# Patient Record
Sex: FEMALE | Race: WHITE | NOT HISPANIC OR LATINO | URBAN - METROPOLITAN AREA
[De-identification: names, ages, dates, MRNs, and addresses within clinical notes are randomized per-mention and may not be internally consistent; named-entity substitution may affect disease eponyms.]

---

## 2017-02-02 ENCOUNTER — OFFICE (OUTPATIENT)
Dept: URBAN - METROPOLITAN AREA CLINIC 19 | Facility: CLINIC | Age: 47
End: 2017-02-02

## 2017-02-02 VITALS
DIASTOLIC BLOOD PRESSURE: 74 MMHG | WEIGHT: 173 LBS | HEART RATE: 76 BPM | HEIGHT: 65 IN | SYSTOLIC BLOOD PRESSURE: 107 MMHG

## 2017-02-02 DIAGNOSIS — E66.3 OVERWEIGHT: ICD-10-CM

## 2017-02-02 DIAGNOSIS — F32.9 MAJOR DEPRESSIVE DISORDER, SINGLE EPISODE, UNSPECIFIED: ICD-10-CM

## 2017-02-02 DIAGNOSIS — K44.9 DIAPHRAGMATIC HERNIA WITHOUT OBSTRUCTION OR GANGRENE: ICD-10-CM

## 2017-02-02 DIAGNOSIS — K31.84 GASTROPARESIS: ICD-10-CM

## 2017-02-02 DIAGNOSIS — K21.9 GASTRO-ESOPHAGEAL REFLUX DISEASE WITHOUT ESOPHAGITIS: ICD-10-CM

## 2017-02-02 DIAGNOSIS — Z86.010 PERSONAL HISTORY OF COLONIC POLYPS: ICD-10-CM

## 2017-02-02 DIAGNOSIS — R63.5 ABNORMAL WEIGHT GAIN: ICD-10-CM

## 2017-02-02 PROCEDURE — 99214 OFFICE O/P EST MOD 30 MIN: CPT | Performed by: INTERNAL MEDICINE

## 2017-05-09 ENCOUNTER — OFFICE (OUTPATIENT)
Dept: URBAN - METROPOLITAN AREA CLINIC 19 | Facility: CLINIC | Age: 47
End: 2017-05-09

## 2017-05-09 VITALS
WEIGHT: 162 LBS | HEART RATE: 91 BPM | DIASTOLIC BLOOD PRESSURE: 83 MMHG | HEIGHT: 65 IN | SYSTOLIC BLOOD PRESSURE: 117 MMHG

## 2017-05-09 DIAGNOSIS — R10.816 EPIGASTRIC ABDOMINAL TENDERNESS: ICD-10-CM

## 2017-05-09 DIAGNOSIS — K44.9 DIAPHRAGMATIC HERNIA WITHOUT OBSTRUCTION OR GANGRENE: ICD-10-CM

## 2017-05-09 DIAGNOSIS — K21.9 GASTRO-ESOPHAGEAL REFLUX DISEASE WITHOUT ESOPHAGITIS: ICD-10-CM

## 2017-05-09 DIAGNOSIS — K31.84 GASTROPARESIS: ICD-10-CM

## 2017-05-09 DIAGNOSIS — Z86.010 PERSONAL HISTORY OF COLONIC POLYPS: ICD-10-CM

## 2017-05-09 LAB
AMYLASE: AMYLASE,SERUM: 60 U/L (ref 28–120)
HEPATIC FUNCTION PANEL A: ALBUMIN: 4.3 G/DL (ref 3.5–5.2)
HEPATIC FUNCTION PANEL A: ALKALINE PHOSPHATASE: 61 U/L (ref 38–103)
HEPATIC FUNCTION PANEL A: DIRECT BILIRUBIN: 0.2 MG/DL (ref 0–0.2)
HEPATIC FUNCTION PANEL A: SGOT (AST): 14 U/L (ref 13–40)
HEPATIC FUNCTION PANEL A: SGPT (ALT): 13 U/L (ref 7–52)
HEPATIC FUNCTION PANEL A: TOTAL BILIRUBIN: 1.3 MG/DL — HIGH (ref 0.3–1.2)
HEPATIC FUNCTION PANEL A: TOTAL PROTEIN: 6.6 G/DL (ref 6.4–8.3)
LIPASE: 32 U/L (ref 11–82)

## 2017-05-09 PROCEDURE — 99214 OFFICE O/P EST MOD 30 MIN: CPT | Performed by: INTERNAL MEDICINE

## 2017-09-12 ENCOUNTER — OFFICE (OUTPATIENT)
Dept: URBAN - METROPOLITAN AREA CLINIC 19 | Facility: CLINIC | Age: 47
End: 2017-09-12

## 2017-09-12 VITALS
HEART RATE: 79 BPM | HEIGHT: 65 IN | SYSTOLIC BLOOD PRESSURE: 132 MMHG | WEIGHT: 160 LBS | DIASTOLIC BLOOD PRESSURE: 89 MMHG

## 2017-09-12 DIAGNOSIS — Z83.71 FAMILY HISTORY OF COLONIC POLYPS: ICD-10-CM

## 2017-09-12 DIAGNOSIS — Z86.010 PERSONAL HISTORY OF COLONIC POLYPS: ICD-10-CM

## 2017-09-12 DIAGNOSIS — E66.3 OVERWEIGHT: ICD-10-CM

## 2017-09-12 DIAGNOSIS — K21.9 GASTRO-ESOPHAGEAL REFLUX DISEASE WITHOUT ESOPHAGITIS: ICD-10-CM

## 2017-09-12 DIAGNOSIS — K31.84 GASTROPARESIS: ICD-10-CM

## 2017-09-12 PROCEDURE — 99213 OFFICE O/P EST LOW 20 MIN: CPT | Performed by: INTERNAL MEDICINE

## 2020-06-11 ENCOUNTER — TELEHEALTH PROVIDED OTHER THAN IN PATIENT'S HOME (OUTPATIENT)
Dept: URBAN - METROPOLITAN AREA TELEHEALTH 10 | Facility: TELEHEALTH | Age: 50
End: 2020-06-11

## 2020-06-11 VITALS — HEIGHT: 65 IN

## 2020-06-11 DIAGNOSIS — R63.5 ABNORMAL WEIGHT GAIN: ICD-10-CM

## 2020-06-11 DIAGNOSIS — K31.84 GASTROPARESIS: ICD-10-CM

## 2020-06-11 DIAGNOSIS — K21.9 GASTRO-ESOPHAGEAL REFLUX DISEASE WITHOUT ESOPHAGITIS: ICD-10-CM

## 2020-06-11 DIAGNOSIS — Z86.010 PERSONAL HISTORY OF COLONIC POLYPS: ICD-10-CM

## 2020-06-11 NOTE — SERVICEHPINOTES
50-year-old white female here for a Telehealth f/u visit. Currently taking over-the-counter Prilosec 20 mg daily and reports good control of reflux. Currently taking metoclopramide 5 mg once a day at bedtime.  Denies any reflux symptoms.  Denies any nausea or vomiting.  Denies any symptoms to suggest EPS.  Reports being diagnosed with bilateral carotid stenosis involving 50% of the vessel.  Reports that recent treadmill was normal.  Denies any hematemesis or melena or hematochezia.  Bowel movements are regular.  Reports weight gain of 10 lb during the lockdown period.Gastric emptying study in Nov 2016 concerning for gastroparesis. EGD and Colonoscopy in Nov 2016: small hiatal hernia and antral gastritis. Biopsies were negative for H.pylori and celiac sprue but revealed active esophagitis. Colonoscopy with removal of two small tubular adenomas but prep was suboptimal. Repeat colonoscopy in Dec 2016 with internal hemorrhoids and removal of a small hyperplastic polyp from the sigmoid. She had an EGD in 2004 by Dr. Mckinney which revealed a hiatal hernia. Colonoscopy at the same time was exam to the cecum, good prep and was normal and biopsies revealed melanosis coli. Gastric emptying study in April 2016 revealed slow emptying after the first hour. No family history of colon cancer but mother was diagnosed with colon polyps in her 50s. She had an EGD in 2004 by Dr. Mckinney which revealed a hiatal hernia. Colonoscopy at the same time was exam to the cecum, good prep and was normal and biopsies revealed melanosis coli.

## 2021-01-28 ENCOUNTER — OFFICE (OUTPATIENT)
Dept: URBAN - METROPOLITAN AREA CLINIC 19 | Facility: CLINIC | Age: 51
End: 2021-01-28

## 2021-01-28 VITALS
OXYGEN SATURATION: 99 % | HEIGHT: 65 IN | WEIGHT: 148 LBS | SYSTOLIC BLOOD PRESSURE: 114 MMHG | HEART RATE: 84 BPM | DIASTOLIC BLOOD PRESSURE: 62 MMHG

## 2021-01-28 DIAGNOSIS — K31.84 GASTROPARESIS: ICD-10-CM

## 2021-01-28 DIAGNOSIS — K21.9 GASTRO-ESOPHAGEAL REFLUX DISEASE WITHOUT ESOPHAGITIS: ICD-10-CM

## 2021-01-28 DIAGNOSIS — Z86.010 PERSONAL HISTORY OF COLONIC POLYPS: ICD-10-CM

## 2021-01-28 DIAGNOSIS — R11.2 NAUSEA WITH VOMITING, UNSPECIFIED: ICD-10-CM

## 2021-01-28 DIAGNOSIS — K92.1 MELENA: ICD-10-CM

## 2021-01-28 DIAGNOSIS — R10.816 EPIGASTRIC ABDOMINAL TENDERNESS: ICD-10-CM

## 2021-01-28 LAB
AMYLASE: 58 U/L (ref 31–110)
CBC, PLATELET, NO DIFFERENTIAL: HEMATOCRIT: 39.8 % (ref 34–46.6)
CBC, PLATELET, NO DIFFERENTIAL: HEMOGLOBIN: 12.8 G/DL (ref 11.1–15.9)
CBC, PLATELET, NO DIFFERENTIAL: MCH: 30 PG (ref 26.6–33)
CBC, PLATELET, NO DIFFERENTIAL: MCHC: 32.2 G/DL (ref 31.5–35.7)
CBC, PLATELET, NO DIFFERENTIAL: MCV: 93 FL (ref 79–97)
CBC, PLATELET, NO DIFFERENTIAL: PLATELETS: 368 X10E3/UL (ref 150–450)
CBC, PLATELET, NO DIFFERENTIAL: RBC: 4.27 X10E6/UL (ref 3.77–5.28)
CBC, PLATELET, NO DIFFERENTIAL: RDW: 13.5 % (ref 11.7–15.4)
CBC, PLATELET, NO DIFFERENTIAL: WBC: 7.3 X10E3/UL (ref 3.4–10.8)
HEPATIC FUNCTION PANEL (7): ALBUMIN: 3.8 G/DL (ref 3.8–4.9)
HEPATIC FUNCTION PANEL (7): ALKALINE PHOSPHATASE: 71 IU/L (ref 39–117)
HEPATIC FUNCTION PANEL (7): ALT (SGPT): 62 IU/L — HIGH (ref 0–32)
HEPATIC FUNCTION PANEL (7): AST (SGOT): 57 IU/L — HIGH (ref 0–40)
HEPATIC FUNCTION PANEL (7): BILIRUBIN, DIRECT: 0.14 MG/DL (ref 0–0.4)
HEPATIC FUNCTION PANEL (7): BILIRUBIN, TOTAL: 0.5 MG/DL (ref 0–1.2)
HEPATIC FUNCTION PANEL (7): PROTEIN, TOTAL: 6.3 G/DL (ref 6–8.5)
LIPASE: 28 U/L (ref 14–72)

## 2021-01-28 PROCEDURE — 99214 OFFICE O/P EST MOD 30 MIN: CPT | Performed by: INTERNAL MEDICINE

## 2021-01-28 RX ORDER — METOCLOPRAMIDE 5 MG/1
TABLET ORAL
Qty: 90 | Refills: 11 | Status: ACTIVE
Start: 2016-11-15

## 2021-01-28 RX ORDER — OMEPRAZOLE 20 MG/1
CAPSULE, DELAYED RELEASE ORAL
Qty: 60 | Refills: 11 | Status: ACTIVE

## 2021-02-03 ENCOUNTER — OFFICE (OUTPATIENT)
Dept: URBAN - METROPOLITAN AREA PATHOLOGY 22 | Facility: PATHOLOGY | Age: 51
End: 2021-02-03

## 2021-02-03 ENCOUNTER — AMBULATORY SURGICAL CENTER (OUTPATIENT)
Dept: URBAN - METROPOLITAN AREA SURGERY 2 | Facility: SURGERY | Age: 51
End: 2021-02-03

## 2021-02-03 VITALS
DIASTOLIC BLOOD PRESSURE: 54 MMHG | SYSTOLIC BLOOD PRESSURE: 106 MMHG | DIASTOLIC BLOOD PRESSURE: 64 MMHG | OXYGEN SATURATION: 98 % | DIASTOLIC BLOOD PRESSURE: 64 MMHG | HEART RATE: 79 BPM | HEIGHT: 65 IN | RESPIRATION RATE: 16 BRPM | OXYGEN SATURATION: 98 % | SYSTOLIC BLOOD PRESSURE: 114 MMHG | SYSTOLIC BLOOD PRESSURE: 110 MMHG | RESPIRATION RATE: 18 BRPM | SYSTOLIC BLOOD PRESSURE: 106 MMHG | HEART RATE: 73 BPM | DIASTOLIC BLOOD PRESSURE: 74 MMHG | HEIGHT: 65 IN | OXYGEN SATURATION: 98 % | HEART RATE: 80 BPM | SYSTOLIC BLOOD PRESSURE: 121 MMHG | SYSTOLIC BLOOD PRESSURE: 114 MMHG | HEART RATE: 80 BPM | OXYGEN SATURATION: 100 % | SYSTOLIC BLOOD PRESSURE: 121 MMHG | DIASTOLIC BLOOD PRESSURE: 54 MMHG | HEART RATE: 80 BPM | DIASTOLIC BLOOD PRESSURE: 74 MMHG | DIASTOLIC BLOOD PRESSURE: 74 MMHG | HEIGHT: 65 IN | SYSTOLIC BLOOD PRESSURE: 110 MMHG | HEART RATE: 73 BPM | OXYGEN SATURATION: 100 % | DIASTOLIC BLOOD PRESSURE: 64 MMHG | RESPIRATION RATE: 16 BRPM | DIASTOLIC BLOOD PRESSURE: 78 MMHG | HEART RATE: 73 BPM | SYSTOLIC BLOOD PRESSURE: 106 MMHG | HEART RATE: 76 BPM | RESPIRATION RATE: 18 BRPM | HEART RATE: 76 BPM | WEIGHT: 150 LBS | TEMPERATURE: 98.2 F | HEART RATE: 72 BPM | DIASTOLIC BLOOD PRESSURE: 60 MMHG | RESPIRATION RATE: 16 BRPM | HEART RATE: 79 BPM | SYSTOLIC BLOOD PRESSURE: 131 MMHG | WEIGHT: 150 LBS | TEMPERATURE: 97.7 F | DIASTOLIC BLOOD PRESSURE: 78 MMHG | SYSTOLIC BLOOD PRESSURE: 131 MMHG | SYSTOLIC BLOOD PRESSURE: 114 MMHG | OXYGEN SATURATION: 100 % | DIASTOLIC BLOOD PRESSURE: 54 MMHG | HEART RATE: 79 BPM | DIASTOLIC BLOOD PRESSURE: 60 MMHG | TEMPERATURE: 98.2 F | TEMPERATURE: 97.7 F | DIASTOLIC BLOOD PRESSURE: 78 MMHG | HEART RATE: 76 BPM | HEART RATE: 72 BPM | SYSTOLIC BLOOD PRESSURE: 131 MMHG | HEART RATE: 72 BPM | TEMPERATURE: 98.2 F | DIASTOLIC BLOOD PRESSURE: 60 MMHG | WEIGHT: 150 LBS | SYSTOLIC BLOOD PRESSURE: 110 MMHG | RESPIRATION RATE: 18 BRPM | SYSTOLIC BLOOD PRESSURE: 121 MMHG | TEMPERATURE: 97.7 F

## 2021-02-03 DIAGNOSIS — K44.9 DIAPHRAGMATIC HERNIA WITHOUT OBSTRUCTION OR GANGRENE: ICD-10-CM

## 2021-02-03 DIAGNOSIS — R11.2 NAUSEA WITH VOMITING, UNSPECIFIED: ICD-10-CM

## 2021-02-03 DIAGNOSIS — K29.50 UNSPECIFIED CHRONIC GASTRITIS WITHOUT BLEEDING: ICD-10-CM

## 2021-02-03 PROBLEM — K31.89 OTHER DISEASES OF STOMACH AND DUODENUM: Status: ACTIVE | Noted: 2021-02-03

## 2021-02-03 PROCEDURE — 43239 EGD BIOPSY SINGLE/MULTIPLE: CPT | Performed by: INTERNAL MEDICINE

## 2021-02-03 PROCEDURE — 88313 SPECIAL STAINS GROUP 2: CPT | Performed by: INTERNAL MEDICINE

## 2021-02-03 PROCEDURE — 88305 TISSUE EXAM BY PATHOLOGIST: CPT | Performed by: INTERNAL MEDICINE

## 2021-02-03 PROCEDURE — 88342 IMHCHEM/IMCYTCHM 1ST ANTB: CPT | Performed by: INTERNAL MEDICINE

## 2021-03-04 ENCOUNTER — OFFICE (OUTPATIENT)
Dept: URBAN - METROPOLITAN AREA CLINIC 19 | Facility: CLINIC | Age: 51
End: 2021-03-04

## 2021-03-04 DIAGNOSIS — K80.20 CALCULUS OF GALLBLADDER WITHOUT CHOLECYSTITIS WITHOUT OBSTRU: ICD-10-CM

## 2021-03-04 PROCEDURE — 76705 ECHO EXAM OF ABDOMEN: CPT | Performed by: INTERNAL MEDICINE

## 2021-05-26 ENCOUNTER — OFFICE (OUTPATIENT)
Dept: URBAN - METROPOLITAN AREA TELEHEALTH 10 | Facility: TELEHEALTH | Age: 51
End: 2021-05-26

## 2021-05-26 VITALS — HEIGHT: 65 IN

## 2021-05-26 DIAGNOSIS — Z86.010 PERSONAL HISTORY OF COLONIC POLYPS: ICD-10-CM

## 2021-05-26 DIAGNOSIS — R11.2 NAUSEA WITH VOMITING, UNSPECIFIED: ICD-10-CM

## 2021-05-26 DIAGNOSIS — R74.8 ABNORMAL LEVELS OF OTHER SERUM ENZYMES: ICD-10-CM

## 2021-05-26 DIAGNOSIS — K21.9 GASTRO-ESOPHAGEAL REFLUX DISEASE WITHOUT ESOPHAGITIS: ICD-10-CM

## 2021-05-26 DIAGNOSIS — K31.84 GASTROPARESIS: ICD-10-CM

## 2021-05-26 RX ORDER — METOCLOPRAMIDE 5 MG/1
TABLET ORAL
Qty: 90 | Refills: 11 | Status: ACTIVE
Start: 2016-11-15

## 2021-05-26 RX ORDER — OMEPRAZOLE 20 MG/1
CAPSULE, DELAYED RELEASE ORAL
Qty: 60 | Refills: 11 | Status: ACTIVE

## 2021-05-26 NOTE — SERVICEHPINOTES
51-year-old white female here for a Telehealth follow-up visit.  Evaluated in January 2021 for abdominal pain, nausea, vomiting and dark stools and bilious emesis. Labs in January 2021 revealed normal pancreatic enzymes and CBC.  AST was 57 and ALT was 62. EGD revealed small hiatal hernia, erythema and erosions in the stomach. Biopsies were negative for H pylori/celiac sprue.  Ultrasound of the liver revealed cholelithiasis.  There was no evidence of gallbladder inflammation.  Liver appeared normal and there was no biliary dilation.  Currently taking Reglan twice daily.  Denies any symptoms to suggest EPS.  Taking omeprazole once a day.  Reports resolution of the abdominal pain, nausea and vomiting.  Bowel movements are regular.  Reports recent blood work at 's office.  Denies any alcohol intake.  Using NSAIDs occasionally.Reports being diagnosed with bilateral carotid stenosis involving 50% of the vessel. Reports that a treadmill test done in 2020 was normal. Gastric emptying study in Nov 2016 concerning for gastroparesis. EGD and Colonoscopy in Nov 2016: small hiatal hernia and antral gastritis. Biopsies were negative for H.pylori and celiac sprue but revealed active esophagitis. Colonoscopy with removal of two small tubular adenomas but prep was suboptimal. Repeat colonoscopy in Dec 2016 with internal hemorrhoids and removal of a small hyperplastic polyp from the sigmoid. She had an EGD in 2004 by Dr. Mckinney which revealed a hiatal hernia. Colonoscopy at the same time was exam to the cecum, good prep and was normal and biopsies revealed melanosis coli. Gastric emptying study in April 2016 revealed slow emptying after the first hour. No family history of colon cancer but mother was diagnosed with colon polyps in her 50s. She had an EGD in 2004 by Dr. Mckinney which revealed a hiatal hernia. Colonoscopy at the same time was exam to the cecum, good prep and was normal and biopsies revealed melanosis coli.

## 2021-12-02 ENCOUNTER — OFFICE (OUTPATIENT)
Dept: URBAN - METROPOLITAN AREA CLINIC 19 | Facility: CLINIC | Age: 51
End: 2021-12-02

## 2021-12-02 VITALS
HEART RATE: 81 BPM | DIASTOLIC BLOOD PRESSURE: 70 MMHG | WEIGHT: 158 LBS | HEIGHT: 65 IN | OXYGEN SATURATION: 100 % | SYSTOLIC BLOOD PRESSURE: 114 MMHG

## 2021-12-02 DIAGNOSIS — K21.9 GASTRO-ESOPHAGEAL REFLUX DISEASE WITHOUT ESOPHAGITIS: ICD-10-CM

## 2021-12-02 DIAGNOSIS — R11.0 NAUSEA: ICD-10-CM

## 2021-12-02 DIAGNOSIS — K80.20 CALCULUS OF GALLBLADDER WITHOUT CHOLECYSTITIS WITHOUT OBSTRU: ICD-10-CM

## 2021-12-02 DIAGNOSIS — Z83.71 FAMILY HISTORY OF COLONIC POLYPS: ICD-10-CM

## 2021-12-02 DIAGNOSIS — Z86.010 PERSONAL HISTORY OF COLONIC POLYPS: ICD-10-CM

## 2021-12-02 DIAGNOSIS — R74.01 ELEVATION OF LEVELS OF LIVER TRANSAMINASE LEVELS: ICD-10-CM

## 2021-12-02 DIAGNOSIS — K31.84 GASTROPARESIS: ICD-10-CM

## 2021-12-02 PROCEDURE — 99214 OFFICE O/P EST MOD 30 MIN: CPT | Performed by: INTERNAL MEDICINE

## 2021-12-02 RX ORDER — SODIUM PICOSULFATE, MAGNESIUM OXIDE, AND ANHYDROUS CITRIC ACID 10; 3.5; 12 MG/160ML; G/160ML; G/160ML
LIQUID ORAL
Qty: 320 | Refills: 0 | Status: COMPLETED
Start: 2021-12-02 | End: 2021-12-20

## 2021-12-02 RX ORDER — OMEPRAZOLE 20 MG/1
CAPSULE, DELAYED RELEASE ORAL
Qty: 60 | Refills: 11 | Status: ACTIVE

## 2021-12-20 ENCOUNTER — AMBULATORY SURGICAL CENTER (OUTPATIENT)
Dept: URBAN - METROPOLITAN AREA SURGERY 2 | Facility: SURGERY | Age: 51
End: 2021-12-20
Payer: COMMERCIAL

## 2021-12-20 ENCOUNTER — OFFICE (OUTPATIENT)
Dept: URBAN - METROPOLITAN AREA PATHOLOGY 22 | Facility: PATHOLOGY | Age: 51
End: 2021-12-20

## 2021-12-20 ENCOUNTER — AMBULATORY SURGICAL CENTER (OUTPATIENT)
Dept: URBAN - METROPOLITAN AREA SURGERY 2 | Facility: SURGERY | Age: 51
End: 2021-12-20

## 2021-12-20 VITALS
DIASTOLIC BLOOD PRESSURE: 59 MMHG | SYSTOLIC BLOOD PRESSURE: 134 MMHG | TEMPERATURE: 98 F | HEART RATE: 70 BPM | SYSTOLIC BLOOD PRESSURE: 134 MMHG | DIASTOLIC BLOOD PRESSURE: 59 MMHG | OXYGEN SATURATION: 99 % | RESPIRATION RATE: 16 BRPM | DIASTOLIC BLOOD PRESSURE: 77 MMHG | HEART RATE: 67 BPM | OXYGEN SATURATION: 98 % | TEMPERATURE: 98.2 F | DIASTOLIC BLOOD PRESSURE: 77 MMHG | OXYGEN SATURATION: 99 % | OXYGEN SATURATION: 97 % | HEART RATE: 71 BPM | SYSTOLIC BLOOD PRESSURE: 129 MMHG | HEART RATE: 71 BPM | SYSTOLIC BLOOD PRESSURE: 129 MMHG | HEART RATE: 73 BPM | HEIGHT: 65 IN | HEART RATE: 71 BPM | DIASTOLIC BLOOD PRESSURE: 68 MMHG | OXYGEN SATURATION: 99 % | HEART RATE: 73 BPM | SYSTOLIC BLOOD PRESSURE: 141 MMHG | TEMPERATURE: 98 F | DIASTOLIC BLOOD PRESSURE: 80 MMHG | SYSTOLIC BLOOD PRESSURE: 134 MMHG | DIASTOLIC BLOOD PRESSURE: 59 MMHG | SYSTOLIC BLOOD PRESSURE: 135 MMHG | DIASTOLIC BLOOD PRESSURE: 77 MMHG | HEART RATE: 70 BPM | OXYGEN SATURATION: 98 % | SYSTOLIC BLOOD PRESSURE: 150 MMHG | SYSTOLIC BLOOD PRESSURE: 150 MMHG | DIASTOLIC BLOOD PRESSURE: 68 MMHG | OXYGEN SATURATION: 97 % | TEMPERATURE: 98 F | RESPIRATION RATE: 16 BRPM | HEIGHT: 65 IN | HEART RATE: 70 BPM | TEMPERATURE: 98.2 F | WEIGHT: 155 LBS | SYSTOLIC BLOOD PRESSURE: 135 MMHG | OXYGEN SATURATION: 97 % | OXYGEN SATURATION: 98 % | DIASTOLIC BLOOD PRESSURE: 79 MMHG | WEIGHT: 155 LBS | DIASTOLIC BLOOD PRESSURE: 80 MMHG | RESPIRATION RATE: 16 BRPM | TEMPERATURE: 98.2 F | SYSTOLIC BLOOD PRESSURE: 141 MMHG | HEART RATE: 73 BPM | SYSTOLIC BLOOD PRESSURE: 129 MMHG | WEIGHT: 155 LBS | DIASTOLIC BLOOD PRESSURE: 79 MMHG | SYSTOLIC BLOOD PRESSURE: 150 MMHG | HEART RATE: 67 BPM | DIASTOLIC BLOOD PRESSURE: 80 MMHG | DIASTOLIC BLOOD PRESSURE: 79 MMHG | HEIGHT: 65 IN | SYSTOLIC BLOOD PRESSURE: 141 MMHG | DIASTOLIC BLOOD PRESSURE: 68 MMHG | SYSTOLIC BLOOD PRESSURE: 135 MMHG | HEART RATE: 67 BPM

## 2021-12-20 DIAGNOSIS — Z86.010 PERSONAL HISTORY OF COLONIC POLYPS: ICD-10-CM

## 2021-12-20 DIAGNOSIS — K21.9 GASTRO-ESOPHAGEAL REFLUX DISEASE WITHOUT ESOPHAGITIS: ICD-10-CM

## 2021-12-20 DIAGNOSIS — R11.0 NAUSEA: ICD-10-CM

## 2021-12-20 DIAGNOSIS — K31.89 OTHER DISEASES OF STOMACH AND DUODENUM: ICD-10-CM

## 2021-12-20 PROCEDURE — G0105 COLORECTAL SCRN; HI RISK IND: HCPCS | Performed by: INTERNAL MEDICINE

## 2021-12-20 PROCEDURE — 88305 TISSUE EXAM BY PATHOLOGIST: CPT | Performed by: INTERNAL MEDICINE

## 2021-12-20 PROCEDURE — 43239 EGD BIOPSY SINGLE/MULTIPLE: CPT | Mod: 51 | Performed by: INTERNAL MEDICINE

## 2022-01-05 ENCOUNTER — OFFICE (OUTPATIENT)
Dept: URBAN - METROPOLITAN AREA PATHOLOGY 22 | Facility: PATHOLOGY | Age: 52
End: 2022-01-05

## 2022-01-05 DIAGNOSIS — R74.01 ELEVATION OF LEVELS OF LIVER TRANSAMINASE LEVELS: ICD-10-CM

## 2022-01-05 PROCEDURE — 88313 SPECIAL STAINS GROUP 2: CPT

## 2022-01-05 PROCEDURE — 88307 TISSUE EXAM BY PATHOLOGIST: CPT

## 2022-03-08 ENCOUNTER — OFFICE (OUTPATIENT)
Dept: URBAN - METROPOLITAN AREA CLINIC 19 | Facility: CLINIC | Age: 52
End: 2022-03-08

## 2022-06-02 ENCOUNTER — OFFICE (OUTPATIENT)
Dept: URBAN - METROPOLITAN AREA CLINIC 19 | Facility: CLINIC | Age: 52
End: 2022-06-02
Payer: COMMERCIAL

## 2022-06-02 VITALS
SYSTOLIC BLOOD PRESSURE: 117 MMHG | DIASTOLIC BLOOD PRESSURE: 73 MMHG | WEIGHT: 164 LBS | HEIGHT: 65 IN | OXYGEN SATURATION: 99 % | HEART RATE: 85 BPM

## 2022-06-02 DIAGNOSIS — R10.13 EPIGASTRIC PAIN: ICD-10-CM

## 2022-06-02 DIAGNOSIS — R74.01 ELEVATION OF LEVELS OF LIVER TRANSAMINASE LEVELS: ICD-10-CM

## 2022-06-02 DIAGNOSIS — R10.12 LEFT UPPER QUADRANT PAIN: ICD-10-CM

## 2022-06-02 DIAGNOSIS — K21.9 GASTRO-ESOPHAGEAL REFLUX DISEASE WITHOUT ESOPHAGITIS: ICD-10-CM

## 2022-06-02 DIAGNOSIS — R10.816 EPIGASTRIC ABDOMINAL TENDERNESS: ICD-10-CM

## 2022-06-02 DIAGNOSIS — Z23 ENCOUNTER FOR IMMUNIZATION: ICD-10-CM

## 2022-06-02 DIAGNOSIS — K31.84 GASTROPARESIS: ICD-10-CM

## 2022-06-02 DIAGNOSIS — R76.8 OTHER SPECIFIED ABNORMAL IMMUNOLOGICAL FINDINGS IN SERUM: ICD-10-CM

## 2022-06-02 LAB
AMYLASE: 70 U/L (ref 31–110)
HEPATIC FUNCTION PANEL (7): ALBUMIN: 4.3 G/DL (ref 3.8–4.9)
HEPATIC FUNCTION PANEL (7): ALKALINE PHOSPHATASE: 111 IU/L (ref 44–121)
HEPATIC FUNCTION PANEL (7): ALT (SGPT): 85 IU/L — HIGH (ref 0–32)
HEPATIC FUNCTION PANEL (7): AST (SGOT): 67 IU/L — HIGH (ref 0–40)
HEPATIC FUNCTION PANEL (7): BILIRUBIN, DIRECT: 0.17 MG/DL (ref 0–0.4)
HEPATIC FUNCTION PANEL (7): BILIRUBIN, TOTAL: 0.7 MG/DL (ref 0–1.2)
HEPATIC FUNCTION PANEL (7): PROTEIN, TOTAL: 7 G/DL (ref 6–8.5)
LIPASE: 21 U/L (ref 14–72)

## 2022-06-02 PROCEDURE — 90632 HEPA VACCINE ADULT IM: CPT | Performed by: INTERNAL MEDICINE

## 2022-06-02 PROCEDURE — 96372 THER/PROPH/DIAG INJ SC/IM: CPT | Performed by: INTERNAL MEDICINE

## 2022-06-02 PROCEDURE — 99214 OFFICE O/P EST MOD 30 MIN: CPT | Mod: 25 | Performed by: INTERNAL MEDICINE

## 2022-06-02 RX ORDER — METOCLOPRAMIDE 5 MG/1
TABLET ORAL
Qty: 90 | Refills: 11 | Status: ACTIVE
Start: 2016-11-15

## 2022-06-02 RX ORDER — OMEPRAZOLE 20 MG/1
CAPSULE, DELAYED RELEASE ORAL
Qty: 60 | Refills: 11 | Status: ACTIVE

## 2022-11-29 ENCOUNTER — OFFICE (OUTPATIENT)
Dept: URBAN - METROPOLITAN AREA CLINIC 19 | Facility: CLINIC | Age: 52
End: 2022-11-29

## 2022-11-29 VITALS
WEIGHT: 172 LBS | HEIGHT: 65 IN | DIASTOLIC BLOOD PRESSURE: 75 MMHG | HEART RATE: 85 BPM | OXYGEN SATURATION: 98 % | SYSTOLIC BLOOD PRESSURE: 127 MMHG

## 2022-11-29 DIAGNOSIS — K31.84 GASTROPARESIS: ICD-10-CM

## 2022-11-29 DIAGNOSIS — R74.01 ELEVATION OF LEVELS OF LIVER TRANSAMINASE LEVELS: ICD-10-CM

## 2022-11-29 DIAGNOSIS — K21.9 GASTRO-ESOPHAGEAL REFLUX DISEASE WITHOUT ESOPHAGITIS: ICD-10-CM

## 2022-11-29 PROCEDURE — 99214 OFFICE O/P EST MOD 30 MIN: CPT | Performed by: INTERNAL MEDICINE

## 2022-11-30 ENCOUNTER — OFFICE (OUTPATIENT)
Dept: URBAN - METROPOLITAN AREA CLINIC 19 | Facility: CLINIC | Age: 52
End: 2022-11-30
Payer: COMMERCIAL

## 2022-11-30 DIAGNOSIS — Z23 ENCOUNTER FOR IMMUNIZATION: ICD-10-CM

## 2022-11-30 LAB
HEPATIC FUNCTION PANEL (7): ALBUMIN: 4.4 G/DL (ref 3.8–4.9)
HEPATIC FUNCTION PANEL (7): ALKALINE PHOSPHATASE: 91 IU/L (ref 44–121)
HEPATIC FUNCTION PANEL (7): ALT (SGPT): 68 IU/L — HIGH (ref 0–32)
HEPATIC FUNCTION PANEL (7): AST (SGOT): 60 IU/L — HIGH (ref 0–40)
HEPATIC FUNCTION PANEL (7): BILIRUBIN, DIRECT: 0.2 MG/DL (ref 0–0.4)
HEPATIC FUNCTION PANEL (7): BILIRUBIN, TOTAL: 0.8 MG/DL (ref 0–1.2)
HEPATIC FUNCTION PANEL (7): PROTEIN, TOTAL: 6.7 G/DL (ref 6–8.5)
SJOGREN'S AB, ANTI-SS-A/-SS-B: SJOGREN'S ANTI-SS-A: <0.2 AI
SJOGREN'S AB, ANTI-SS-A/-SS-B: SJOGREN'S ANTI-SS-B: <0.2 AI

## 2022-11-30 PROCEDURE — 90632 HEPA VACCINE ADULT IM: CPT | Performed by: INTERNAL MEDICINE

## 2022-11-30 PROCEDURE — 90471 IMMUNIZATION ADMIN: CPT | Performed by: INTERNAL MEDICINE

## 2022-12-02 VITALS — HEIGHT: 65 IN

## 2023-06-13 ENCOUNTER — OFFICE (OUTPATIENT)
Dept: URBAN - METROPOLITAN AREA CLINIC 19 | Facility: CLINIC | Age: 53
End: 2023-06-13

## 2023-06-13 VITALS
DIASTOLIC BLOOD PRESSURE: 79 MMHG | OXYGEN SATURATION: 99 % | SYSTOLIC BLOOD PRESSURE: 149 MMHG | WEIGHT: 182 LBS | HEIGHT: 65 IN | HEART RATE: 84 BPM

## 2023-06-13 DIAGNOSIS — K21.9 GASTRO-ESOPHAGEAL REFLUX DISEASE WITHOUT ESOPHAGITIS: ICD-10-CM

## 2023-06-13 DIAGNOSIS — K31.84 GASTROPARESIS: ICD-10-CM

## 2023-06-13 DIAGNOSIS — R76.8 OTHER SPECIFIED ABNORMAL IMMUNOLOGICAL FINDINGS IN SERUM: ICD-10-CM

## 2023-06-13 DIAGNOSIS — R63.5 ABNORMAL WEIGHT GAIN: ICD-10-CM

## 2023-06-13 DIAGNOSIS — R74.01 ELEVATION OF LEVELS OF LIVER TRANSAMINASE LEVELS: ICD-10-CM

## 2023-06-13 PROCEDURE — 99214 OFFICE O/P EST MOD 30 MIN: CPT | Performed by: INTERNAL MEDICINE

## 2023-06-15 LAB
ACTIN (SMOOTH MUSCLE) ANTIBODY: 40 UNITS — HIGH (ref 0–19)
ANA, IFA RFX 9 MARK MULTIPLEX: ANA BY IFA RFX TITER/PATTERN: NEGATIVE
ANA, IFA RFX 9 MARK MULTIPLEX: PLEASE NOTE: (no result)

## 2023-11-28 ENCOUNTER — OFFICE (OUTPATIENT)
Dept: URBAN - METROPOLITAN AREA CLINIC 19 | Facility: CLINIC | Age: 53
End: 2023-11-28

## 2023-11-28 VITALS
HEIGHT: 65 IN | OXYGEN SATURATION: 100 % | HEART RATE: 78 BPM | SYSTOLIC BLOOD PRESSURE: 130 MMHG | DIASTOLIC BLOOD PRESSURE: 74 MMHG | WEIGHT: 192 LBS

## 2023-11-28 DIAGNOSIS — Z83.719 FAMILY HISTORY OF COLON POLYPS, UNSPECIFIED: ICD-10-CM

## 2023-11-28 DIAGNOSIS — R63.5 ABNORMAL WEIGHT GAIN: ICD-10-CM

## 2023-11-28 DIAGNOSIS — K31.84 GASTROPARESIS: ICD-10-CM

## 2023-11-28 DIAGNOSIS — K21.9 GASTRO-ESOPHAGEAL REFLUX DISEASE WITHOUT ESOPHAGITIS: ICD-10-CM

## 2023-11-28 DIAGNOSIS — R74.01 ELEVATION OF LEVELS OF LIVER TRANSAMINASE LEVELS: ICD-10-CM

## 2023-11-28 PROCEDURE — 99214 OFFICE O/P EST MOD 30 MIN: CPT

## 2023-11-28 NOTE — SERVICEHPINOTES
53-year-old female here for a follow-up visit. Taking Reglan as prescribed. Denies any symptoms to suggest EPS. Taking omeprazole once daily before bedtime.  Reports occasional reflux during the day due to her diet.  Bowel movements are regular.  Denies abdominal pain. Denies any alcohol intake. Appears to have gained 10 pounds since her last clinic visit in June 2023.  Outside blood work from 11/10/23 revealed platelets 433, rest of CBC was normal.  AST 80, ALT 99, rest of CMP was normal.  Normal iron panel, TSH, and vitamin-D.  A1c 6.1.  HDL 43, rest of lipid panel was normal.
br
br Labs in June 2023 revealed ALEX negative.  ASM elevated at 40.  ALT 59 and AST 52.br
br Labs in April 2023 revealed  and . Patient has history of osteoporosis and was Fosamax,  history of hyperlipidemia and was taking atorvastatin and ezetimibe.  She was recently taken off the 3 medications. Has stopped taking all herbal supplements. Repeat blood work in May 2023 showed AST 68, , and GGT 65.   Recent serum protein electrophoresis was normal. Patient has gained 10 lb since her last visit in November 2022. Reports she had an abdominal ultrasound and repeat blood work last Wednesday with her primary care physician, Dr. Sawant.  Denies  abdominal pain, changes in her bowel habits, melena, hematochezia, nausea, vomiting, hematemesis.  Labs in November 2022 revealed AST 60, ALT 68. test for Sjogren's was negative.  She completed the hepatitis A vaccination series.Evaluated in December 2021 for nausea, reflux and elevated liver enzymes.  Iron saturation was 35, ferritin 36.  CK was normal.  Ceruloplasmin level was normal.  Alpha 1 antitrypsin phenotype was MM.  ALEX was elevated in 1:160 titer.  SMA was elevated at 39.  Hep C antibody was negative and hep B surface antigen was negative.  She was immune to hepatitis B but not to A.   She has completed her hepatitis-A vaccinations. Liver biopsy in January 2022 was concerning for drug-induced hepatitis.  There was no sign of interface hepatitis to suggest autoimmune hepatitis.  There was no fibrosis.  Moderate lymphocytic infiltrate was noted within the portal tracts. EGD and colonoscopy in December 2021 revealed 2 cm hiatal hernia, erythema in the antrum and fundic gland polyps.  Biopsies were negative for H pylori /celiac sprue.  Colonoscopy revealed small internal hemorrhoids.EGD in 2021 revealed small hiatal hernia, erythema and erosions in the stomach. Biopsies were negative for H pylori/celiac sprue.  Ultrasound of the liver revealed cholelithiasis.  There was no evidence of gallbladder inflammation.  Liver appeared normal and there was no biliary dilation.  Reports being diagnosed with bilateral carotid stenosis involving 50% of the vessel. Reports that a treadmill test done in 2020 was normal.Gastric emptying study in Nov 2016 concerning for gastroparesis. EGD and Colonoscopy in Nov 2016: small hiatal hernia and antral gastritis. Biopsies were negative for H.pylori and celiac sprue but revealed active esophagitis. Colonoscopy with removal of two small tubular adenomas but prep was suboptimal. Repeat colonoscopy in Dec 2016 with internal hemorrhoids and removal of a small hyperplastic polyp from the sigmoid.She had an EGD in 2004 by Dr. Mckinney which revealed a hiatal hernia. Colonoscopy at the same time was exam to the cecum, good prep and was normal and biopsies revealed melanosis coli. Gastric emptying study in April 2016 revealed slow emptying after the first hour. No family history of colon cancer but mother was diagnosed with colon polyps in her 50s. She had an EGD in 2004 by Dr. Mckinney which revealed a hiatal hernia. Colonoscopy at the same time was exam to the cecum, good prep and was normal and biopsies revealed melanosis coli.

## 2023-11-28 NOTE — SERVICENOTES
MD Addendum: I, Marianne Soto MD, independently interviewed and examined this patient and made modifications to the final HPI, exam, impression, and plan as initially scribed by Yanet Santos NP.

## 2023-11-30 LAB
PDF REPORT: PDF REPORT1: (no result)
PROTEIN ELECTRO.,S: A/G RATIO: 0.9 (ref 0.7–1.7)
PROTEIN ELECTRO.,S: ALBUMIN: 3.5 G/DL (ref 2.9–4.4)
PROTEIN ELECTRO.,S: ALPHA-1-GLOBULIN: 0.3 G/DL (ref 0–0.4)
PROTEIN ELECTRO.,S: ALPHA-2-GLOBULIN: 0.8 G/DL (ref 0.4–1)
PROTEIN ELECTRO.,S: BETA GLOBULIN: 1.2 G/DL (ref 0.7–1.3)
PROTEIN ELECTRO.,S: GAMMA GLOBULIN: 1.4 G/DL (ref 0.4–1.8)
PROTEIN ELECTRO.,S: GLOBULIN, TOTAL: 3.7 G/DL (ref 2.2–3.9)
PROTEIN ELECTRO.,S: M-SPIKE: (no result) G/DL
PROTEIN ELECTRO.,S: PDF: (no result)
PROTEIN ELECTRO.,S: PLEASE NOTE: (no result)
PROTEIN ELECTRO.,S: PROTEIN, TOTAL: 7.2 G/DL (ref 6–8.5)

## 2024-02-06 ENCOUNTER — OFFICE (OUTPATIENT)
Dept: URBAN - METROPOLITAN AREA CLINIC 19 | Facility: CLINIC | Age: 54
End: 2024-02-06

## 2024-02-06 VITALS
HEART RATE: 79 BPM | SYSTOLIC BLOOD PRESSURE: 146 MMHG | OXYGEN SATURATION: 97 % | HEIGHT: 65 IN | WEIGHT: 186 LBS | DIASTOLIC BLOOD PRESSURE: 84 MMHG

## 2024-02-06 DIAGNOSIS — K21.9 GASTRO-ESOPHAGEAL REFLUX DISEASE WITHOUT ESOPHAGITIS: ICD-10-CM

## 2024-02-06 DIAGNOSIS — R93.89 ABNORMAL FINDINGS ON DIAGNOSTIC IMAGING OF OTHER SPECIFIED B: ICD-10-CM

## 2024-02-06 DIAGNOSIS — K31.84 GASTROPARESIS: ICD-10-CM

## 2024-02-06 DIAGNOSIS — R74.01 ELEVATION OF LEVELS OF LIVER TRANSAMINASE LEVELS: ICD-10-CM

## 2024-02-06 PROCEDURE — 99213 OFFICE O/P EST LOW 20 MIN: CPT

## 2024-02-06 NOTE — SERVICEHPINOTES
53-year-old female here for a follow-up visit. Taking Reglan 3 times daily. Occasionally takes Reglan after meals. Denies any symptoms to suggest EPS. Taking omeprazole once daily before bedtime.  Bowel movements are regular.  Denies abdominal pain. Denies any alcohol intake.  Appears to have lost 6 lb since her last clinic visit in November 2023.  Serum protein electrophoresis was negative at that time.  Reports recent CT scan with PCP showed gallstones and an enlarged esophagus.  I do not have access to this report at this time.br
br Outside blood work in November 2023 revealed platelets 433, rest of CBC was normal.  AST 80, ALT 99, rest of CMP was normal.  Normal iron panel, TSH, and vitamin-D.  A1c 6.1.  HDL 43, rest of lipid panel was normal.Labs in June 2023 revealed ALEX negative.  ASM elevated at 40.  ALT 59 and AST 52.Labs in April 2023 revealed  and . Patient has history of osteoporosis and was Fosamax,  history of hyperlipidemia and was taking atorvastatin and ezetimibe.  She was recently taken off the 3 medications. Has stopped taking all herbal supplements. Repeat blood work in May 2023 showed AST 68, , and GGT 65.   Recent serum protein electrophoresis was normal. Patient has gained 10 lb since her last visit in November 2022. Reports she had an abdominal ultrasound and repeat blood work last Wednesday with her primary care physician, Dr. Sawant.  Denies  abdominal pain, changes in her bowel habits, melena, hematochezia, nausea, vomiting, hematemesis.  Labs in November 2022 revealed AST 60, ALT 68. test for Sjogren's was negative.  She completed the hepatitis A vaccination series.Evaluated in December 2021 for nausea, reflux and elevated liver enzymes.  Iron saturation was 35, ferritin 36.  CK was normal.  Ceruloplasmin level was normal.  Alpha 1 antitrypsin phenotype was MM.  ALEX was elevated in 1:160 titer.  SMA was elevated at 39.  Hep C antibody was negative and hep B surface antigen was negative.  She was immune to hepatitis B but not to A.   She has completed her hepatitis-A vaccinations. Liver biopsy in January 2022 was concerning for drug-induced hepatitis.  There was no sign of interface hepatitis to suggest autoimmune hepatitis.  There was no fibrosis.  Moderate lymphocytic infiltrate was noted within the portal tracts. EGD and colonoscopy in December 2021 revealed 2 cm hiatal hernia, erythema in the antrum and fundic gland polyps.  Biopsies were negative for H pylori /celiac sprue.  Colonoscopy revealed small internal hemorrhoids.EGD in 2021 revealed small hiatal hernia, erythema and erosions in the stomach. Biopsies were negative for H pylori/celiac sprue.  Ultrasound of the liver revealed cholelithiasis.  There was no evidence of gallbladder inflammation.  Liver appeared normal and there was no biliary dilation.  Reports being diagnosed with bilateral carotid stenosis involving 50% of the vessel. Reports that a treadmill test done in 2020 was normal.Gastric emptying study in Nov 2016 concerning for gastroparesis. EGD and Colonoscopy in Nov 2016: small hiatal hernia and antral gastritis. Biopsies were negative for H.pylori and celiac sprue but revealed active esophagitis. Colonoscopy with removal of two small tubular adenomas but prep was suboptimal. Repeat colonoscopy in Dec 2016 with internal hemorrhoids and removal of a small hyperplastic polyp from the sigmoid.She had an EGD in 2004 by Dr. Mckinney which revealed a hiatal hernia. Colonoscopy at the same time was exam to the cecum, good prep and was normal and biopsies revealed melanosis coli. Gastric emptying study in April 2016 revealed slow emptying after the first hour. No family history of colon cancer but mother was diagnosed with colon polyps in her 50s. She had an EGD in 2004 by Dr. Mckinney which revealed a hiatal hernia. Colonoscopy at the same time was exam to the cecum, good prep and was normal and biopsies revealed melanosis coli.

## 2024-02-06 NOTE — SERVICENOTES
Procedure and risks including but not limited to anesthesia, bleeding perforation, etc. were discussed with the patient in detail., 

MD Addendum: IMarianne MD, independently interviewed and examined this patient and made modifications to the final HPI, exam, impression, and plan as initially scribed by Yanet Santos NP.

## 2024-02-07 LAB
HEPATIC FUNCTION PANEL (7): ALBUMIN: 4.7 G/DL (ref 3.8–4.9)
HEPATIC FUNCTION PANEL (7): ALKALINE PHOSPHATASE: 56 IU/L (ref 44–121)
HEPATIC FUNCTION PANEL (7): ALT (SGPT): 53 IU/L — HIGH (ref 0–32)
HEPATIC FUNCTION PANEL (7): AST (SGOT): 46 IU/L — HIGH (ref 0–40)
HEPATIC FUNCTION PANEL (7): BILIRUBIN, DIRECT: 0.14 MG/DL (ref 0–0.4)
HEPATIC FUNCTION PANEL (7): BILIRUBIN, TOTAL: 0.5 MG/DL (ref 0–1.2)
HEPATIC FUNCTION PANEL (7): PROTEIN, TOTAL: 7.4 G/DL (ref 6–8.5)

## 2024-06-11 ENCOUNTER — AMBULATORY SURGICAL CENTER (OUTPATIENT)
Dept: URBAN - METROPOLITAN AREA SURGERY 2 | Facility: SURGERY | Age: 54
End: 2024-06-11

## 2024-06-11 ENCOUNTER — OFFICE (OUTPATIENT)
Dept: URBAN - METROPOLITAN AREA PATHOLOGY 12 | Facility: PATHOLOGY | Age: 54
End: 2024-06-11

## 2024-06-11 VITALS
RESPIRATION RATE: 16 BRPM | HEART RATE: 112 BPM | DIASTOLIC BLOOD PRESSURE: 92 MMHG | DIASTOLIC BLOOD PRESSURE: 56 MMHG | OXYGEN SATURATION: 95 % | SYSTOLIC BLOOD PRESSURE: 130 MMHG | HEART RATE: 84 BPM | HEIGHT: 65 IN | DIASTOLIC BLOOD PRESSURE: 80 MMHG | DIASTOLIC BLOOD PRESSURE: 56 MMHG | HEART RATE: 112 BPM | DIASTOLIC BLOOD PRESSURE: 92 MMHG | OXYGEN SATURATION: 96 % | SYSTOLIC BLOOD PRESSURE: 129 MMHG | TEMPERATURE: 97.8 F | SYSTOLIC BLOOD PRESSURE: 129 MMHG | OXYGEN SATURATION: 96 % | SYSTOLIC BLOOD PRESSURE: 132 MMHG | OXYGEN SATURATION: 93 % | RESPIRATION RATE: 16 BRPM | DIASTOLIC BLOOD PRESSURE: 79 MMHG | SYSTOLIC BLOOD PRESSURE: 141 MMHG | HEIGHT: 65 IN | DIASTOLIC BLOOD PRESSURE: 56 MMHG | TEMPERATURE: 97.8 F | SYSTOLIC BLOOD PRESSURE: 141 MMHG | RESPIRATION RATE: 18 BRPM | DIASTOLIC BLOOD PRESSURE: 92 MMHG | HEART RATE: 114 BPM | SYSTOLIC BLOOD PRESSURE: 132 MMHG | HEIGHT: 65 IN | SYSTOLIC BLOOD PRESSURE: 130 MMHG | HEART RATE: 114 BPM | TEMPERATURE: 97.8 F | RESPIRATION RATE: 18 BRPM | OXYGEN SATURATION: 96 % | WEIGHT: 192 LBS | DIASTOLIC BLOOD PRESSURE: 80 MMHG | DIASTOLIC BLOOD PRESSURE: 79 MMHG | OXYGEN SATURATION: 95 % | SYSTOLIC BLOOD PRESSURE: 132 MMHG | HEART RATE: 114 BPM | HEART RATE: 84 BPM | SYSTOLIC BLOOD PRESSURE: 129 MMHG | OXYGEN SATURATION: 93 % | SYSTOLIC BLOOD PRESSURE: 137 MMHG | WEIGHT: 192 LBS | SYSTOLIC BLOOD PRESSURE: 141 MMHG | DIASTOLIC BLOOD PRESSURE: 80 MMHG | SYSTOLIC BLOOD PRESSURE: 130 MMHG | HEART RATE: 112 BPM | TEMPERATURE: 97.7 F | OXYGEN SATURATION: 93 % | TEMPERATURE: 97.7 F | TEMPERATURE: 97.7 F | HEART RATE: 84 BPM | WEIGHT: 192 LBS | SYSTOLIC BLOOD PRESSURE: 137 MMHG | OXYGEN SATURATION: 95 % | RESPIRATION RATE: 16 BRPM | RESPIRATION RATE: 18 BRPM | DIASTOLIC BLOOD PRESSURE: 79 MMHG | SYSTOLIC BLOOD PRESSURE: 137 MMHG

## 2024-06-11 DIAGNOSIS — K29.50 UNSPECIFIED CHRONIC GASTRITIS WITHOUT BLEEDING: ICD-10-CM

## 2024-06-11 DIAGNOSIS — R93.3 ABNORMAL FINDINGS ON DIAGNOSTIC IMAGING OF OTHER PARTS OF DI: ICD-10-CM

## 2024-06-11 DIAGNOSIS — K44.9 DIAPHRAGMATIC HERNIA WITHOUT OBSTRUCTION OR GANGRENE: ICD-10-CM

## 2024-06-11 DIAGNOSIS — R93.89 ABNORMAL FINDINGS ON DIAGNOSTIC IMAGING OF OTHER SPECIFIED B: ICD-10-CM

## 2024-06-11 DIAGNOSIS — K31.89 OTHER DISEASES OF STOMACH AND DUODENUM: ICD-10-CM

## 2024-06-11 PROCEDURE — 43239 EGD BIOPSY SINGLE/MULTIPLE: CPT | Performed by: INTERNAL MEDICINE

## 2024-06-11 PROCEDURE — 88313 SPECIAL STAINS GROUP 2: CPT | Performed by: STUDENT IN AN ORGANIZED HEALTH CARE EDUCATION/TRAINING PROGRAM

## 2024-06-11 PROCEDURE — 88305 TISSUE EXAM BY PATHOLOGIST: CPT | Performed by: STUDENT IN AN ORGANIZED HEALTH CARE EDUCATION/TRAINING PROGRAM

## 2024-06-11 PROCEDURE — 88342 IMHCHEM/IMCYTCHM 1ST ANTB: CPT | Performed by: STUDENT IN AN ORGANIZED HEALTH CARE EDUCATION/TRAINING PROGRAM

## 2024-06-14 LAB
GASTRO ONE PATHOLOGY: PDF REPORT: (no result)

## 2024-07-17 ENCOUNTER — OFFICE (OUTPATIENT)
Dept: URBAN - METROPOLITAN AREA CLINIC 19 | Facility: CLINIC | Age: 54
End: 2024-07-17

## 2024-07-17 VITALS
WEIGHT: 191 LBS | SYSTOLIC BLOOD PRESSURE: 146 MMHG | HEART RATE: 75 BPM | OXYGEN SATURATION: 98 % | HEIGHT: 65 IN | DIASTOLIC BLOOD PRESSURE: 91 MMHG

## 2024-07-17 DIAGNOSIS — K31.84 GASTROPARESIS: ICD-10-CM

## 2024-07-17 DIAGNOSIS — R74.01 ELEVATION OF LEVELS OF LIVER TRANSAMINASE LEVELS: ICD-10-CM

## 2024-07-17 DIAGNOSIS — K21.9 GASTRO-ESOPHAGEAL REFLUX DISEASE WITHOUT ESOPHAGITIS: ICD-10-CM

## 2024-07-17 DIAGNOSIS — R10.13 EPIGASTRIC PAIN: ICD-10-CM

## 2024-07-17 PROCEDURE — 99214 OFFICE O/P EST MOD 30 MIN: CPT | Performed by: INTERNAL MEDICINE

## 2024-07-17 RX ORDER — OMEPRAZOLE 20 MG/1
CAPSULE, DELAYED RELEASE ORAL
Qty: 60 | Refills: 11 | Status: ACTIVE

## 2024-07-17 NOTE — SERVICENOTES
MD Addendum: I, Marianne Soto MD, independently interviewed and examined this patient and made modifications to the final HPI, exam, impression, and plan as initially scribed by Jaclyn Rico NP.

## 2024-07-17 NOTE — SERVICEHPINOTES
54-year-old female here for a follow-up visit. Currently suffering from epigastric pain and pressure that is constant, gnawing and aggravating in nature. Denies any radiation or alleviating/aggravating factors. Denies any fever or chills. Reports buying a wedge to use at night but denies any improvement in symptoms. Reports taking Omeprazole 40 mg daily in the morning. This provides some relief, but still reports breakthrough symptoms. Denies taking Reglan on a consistent basis, reports forgetting to take the medication at mealtimes. Reports daily bowel movements with sense of complete evacuation. Denies any straining or hard stool. Denies any hematochezia, melena or hematemesis. Denies any soda/carbonated beverage, alcohol or NSAID use. Does take a daily aspirin with food. Denies any current weight loss mediation use, was taking phentermine until late May 2024. Reports starting the [employer] diet about two weeks ago, has not seen any weight loss since initiation. Appears to have gained five pounds since last clinic visit in February 2024. Labs from February 2024 revealed AST 46, ALT 53, ALP 56. EGD in June 2024 revealed normal esophagus, erythema in the antrum, small hiatal hernia and normal duodenum. Pathology results showed mild chronic gastritis and biopsies were negative for H.Pylori. Office note from Dr. Huber was reviewed. br
brLabs from November 2023 revealed negative serum protein electrophoresis. Reported recent CT scan with PCP showed gallstones and an enlarged esophagus. Did not have access to this report at that time.Outside blood work in November 2023 revealed platelets 433, rest of CBC was normal. AST 80, ALT 99, rest of CMP was normal. Normal iron panel, TSH, and vitamin-D.  A1c 6.1. HDL 43, rest of lipid panel was normal. Labs in June 2023 revealed ALEX negative.  ASM elevated at 40.  ALT 59 and AST 52.Labs in April 2023 revealed  and . Patient has history of osteoporosis and was Fosamax, history of hyperlipidemia and was taking atorvastatin and ezetimibe. She was recently taken off the 3 medications. Has stopped taking all herbal supplements. Repeat blood work in May 2023 showed AST 68, , and GGT 65.  Recent serum protein electrophoresis was normal. Reports she had an abdominal ultrasound and repeat blood work last Wednesday with her primary care physician, Dr. Sawant. Denies abdominal pain, changes in her bowel habits, melena, hematochezia, nausea, vomiting, hematemesis. Labs in November 2022 revealed AST 60, ALT 68. test for Sjogren's was negative. She completed the hepatitis A vaccination series.Evaluated in December 2021 for nausea, reflux and elevated liver enzymes.  Iron saturation was 35, ferritin 36.  CK was normal.  Ceruloplasmin level was normal.  Alpha 1 antitrypsin phenotype was MM.  ALEX was elevated in 1:160 titer.  SMA was elevated at 39.  Hep C antibody was negative and hep B surface antigen was negative.  She was immune to hepatitis B but not to A.   She has completed her hepatitis-A vaccinations. Liver biopsy in January 2022 was concerning for drug-induced hepatitis.  There was no sign of interface hepatitis to suggest autoimmune hepatitis.  There was no fibrosis.  Moderate lymphocytic infiltrate was noted within the portal tracts. EGD and colonoscopy in December 2021 revealed 2 cm hiatal hernia, erythema in the antrum and fundic gland polyps.  Biopsies were negative for H pylori /celiac sprue.  Colonoscopy revealed small internal hemorrhoids.EGD in 2021 revealed small hiatal hernia, erythema and erosions in the stomach. Biopsies were negative for H pylori/celiac sprue.  Ultrasound of the liver revealed cholelithiasis.  There was no evidence of gallbladder inflammation.  Liver appeared normal and there was no biliary dilation.  Reports being diagnosed with bilateral carotid stenosis involving 50% of the vessel. Reports that a treadmill test done in 2020 was normal.Gastric emptying study in Nov 2016 concerning for gastroparesis. EGD and Colonoscopy in Nov 2016: small hiatal hernia and antral gastritis. Biopsies were negative for H.pylori and celiac sprue but revealed active esophagitis. Colonoscopy with removal of two small tubular adenomas but prep was suboptimal. Repeat colonoscopy in Dec 2016 with internal hemorrhoids and removal of a small hyperplastic polyp from the sigmoid.She had an EGD in 2004 by Dr. Mckinney which revealed a hiatal hernia. Colonoscopy at the same time was exam to the cecum, good prep and was normal and biopsies revealed melanosis coli. Gastric emptying study in April 2016 revealed slow emptying after the first hour. No family history of colon cancer but mother was diagnosed with colon polyps in her 50s. She had an EGD in 2004 by Dr. Mckinney which revealed a hiatal hernia. Colonoscopy at the same time was exam to the cecum, good prep and was normal and biopsies revealed melanosis coli.

## 2024-07-18 LAB
AMYLASE: 44 U/L (ref 31–110)
HEPATIC FUNCTION PANEL (7): ALBUMIN: 4.6 G/DL (ref 3.8–4.9)
HEPATIC FUNCTION PANEL (7): ALKALINE PHOSPHATASE: 105 IU/L (ref 44–121)
HEPATIC FUNCTION PANEL (7): ALT (SGPT): 86 IU/L — HIGH (ref 0–32)
HEPATIC FUNCTION PANEL (7): AST (SGOT): 53 IU/L — HIGH (ref 0–40)
HEPATIC FUNCTION PANEL (7): BILIRUBIN, DIRECT: 0.21 MG/DL (ref 0–0.4)
HEPATIC FUNCTION PANEL (7): BILIRUBIN, TOTAL: 0.6 MG/DL (ref 0–1.2)
HEPATIC FUNCTION PANEL (7): PROTEIN, TOTAL: 7.5 G/DL (ref 6–8.5)
LIPASE: 23 U/L (ref 14–72)